# Patient Record
Sex: FEMALE | Race: ASIAN | NOT HISPANIC OR LATINO | URBAN - METROPOLITAN AREA
[De-identification: names, ages, dates, MRNs, and addresses within clinical notes are randomized per-mention and may not be internally consistent; named-entity substitution may affect disease eponyms.]

---

## 2021-06-18 ENCOUNTER — EMERGENCY (EMERGENCY)
Facility: HOSPITAL | Age: 21
LOS: 1 days | Discharge: HOME | End: 2021-06-18
Attending: EMERGENCY MEDICINE | Admitting: EMERGENCY MEDICINE
Payer: COMMERCIAL

## 2021-06-18 VITALS
HEART RATE: 61 BPM | RESPIRATION RATE: 16 BRPM | DIASTOLIC BLOOD PRESSURE: 68 MMHG | TEMPERATURE: 98 F | HEIGHT: 65 IN | WEIGHT: 177.91 LBS | SYSTOLIC BLOOD PRESSURE: 106 MMHG | OXYGEN SATURATION: 97 %

## 2021-06-18 PROCEDURE — 99285 EMERGENCY DEPT VISIT HI MDM: CPT

## 2021-06-18 RX ORDER — SODIUM CHLORIDE 9 MG/ML
1000 INJECTION INTRAMUSCULAR; INTRAVENOUS; SUBCUTANEOUS ONCE
Refills: 0 | Status: COMPLETED | OUTPATIENT
Start: 2021-06-18 | End: 2021-06-18

## 2021-06-18 RX ORDER — KETOROLAC TROMETHAMINE 30 MG/ML
15 SYRINGE (ML) INJECTION ONCE
Refills: 0 | Status: DISCONTINUED | OUTPATIENT
Start: 2021-06-18 | End: 2021-06-18

## 2021-06-18 RX ORDER — IOHEXOL 300 MG/ML
30 INJECTION, SOLUTION INTRAVENOUS ONCE
Refills: 0 | Status: COMPLETED | OUTPATIENT
Start: 2021-06-18 | End: 2021-06-18

## 2021-06-18 RX ORDER — ONDANSETRON 8 MG/1
4 TABLET, FILM COATED ORAL ONCE
Refills: 0 | Status: DISCONTINUED | OUTPATIENT
Start: 2021-06-18 | End: 2021-06-22

## 2021-06-18 RX ADMIN — SODIUM CHLORIDE 1000 MILLILITER(S): 9 INJECTION INTRAMUSCULAR; INTRAVENOUS; SUBCUTANEOUS at 23:54

## 2021-06-18 RX ADMIN — IOHEXOL 30 MILLILITER(S): 300 INJECTION, SOLUTION INTRAVENOUS at 23:55

## 2021-06-18 NOTE — ED ADULT NURSE NOTE - OBJECTIVE STATEMENT
Pt presents to ED c/o abd pain to RLQ. Pt felt sick after eating and forced herself to vomit. Pt states she had diarrhea in the morning. Pt had some burning while urinating.

## 2021-06-18 NOTE — ED PROVIDER NOTE - PROGRESS NOTE DETAILS
labs and imaging explained, will f/u with outpt gyn pt had near syncopal episode while getting up from bed, will obtain ekg, IV fluids feels better likely vasovagal, VSS

## 2021-06-18 NOTE — ED PROVIDER NOTE - ATTENDING CONTRIBUTION TO CARE
21 yo female c/o periumbilical rlq pain x a few hours after eating dinner, sudden onset, nonradiating, with 1 episode of nausea/vomiting.  NO fever/chills.      Gen: Alert, NAD  Head/eyes: NC/AT, PERRL  ENT: airway patent  Neck: supple, no tenderness/meningismus/JVD, Trachea midline  Pulm/lung: Bilateral clear BS, normal resp effort, no wheeze/stridor/retractions  CV/heart: RRR, no M/R/G, +2 dist pulses (radial, pedal DP/PT, popliteal)  GI/Abd: soft, +mild ttp RLQ/ND, +BS, no guarding/rebound tenderness  Musculoskeletal: no edema/erythema/cyanosis, FROM in all extremities, no C/T/L spine ttp  Skin: no rash, no vesicles, no petechaie, no ecchymosis, no swelling  Neuro: AAOx3, CN 2-12 intact, normal sensation, 5/5 motor strength in all extremities, normal gait    CT negative for appendicitis, +ovarian cyst rupture, IV analgesia

## 2021-06-18 NOTE — ED PROVIDER NOTE - PATIENT PORTAL LINK FT
You can access the FollowMyHealth Patient Portal offered by [facility] by registering at the following website: [email or web address]. By joining [employer]’s FollowMyHealth portal, you will also be able to view your health information using other applications (apps) compatible with our system.

## 2021-06-18 NOTE — ED PROVIDER NOTE - NSFOLLOWUPINSTRUCTIONS_ED_ALL_ED_FT
1) Follow-up with your Primary Medical Doctor and GYN. Call today / next business day for prompt follow-up.  2) Return to Emergency room for any worsening or persistent pain, weakness, fever, or any other concerning symptoms.  3) See attached instruction sheets for additional information, including information regarding signs and symptoms to look out for, reasons to seek immediate care and other important instructions.  4) Advil 600mg every 6 hours as needed for pain. Take with food.      WHAT YOU NEED TO KNOW:    A ruptured ovarian cyst is a cyst that breaks open. A cyst is a sac that grows on an ovary. This sac usually contains fluid, but may sometimes have blood or tissue in it. A large cyst that ruptures may lead to problems that need immediate care. It is important to follow up with your healthcare provider to make sure your cyst went away completely with treatment.     DISCHARGE INSTRUCTIONS:    Call 911 for any of the following:   •You are too weak or dizzy to stand up.          Return to the emergency department if:   •You have severe pain in your pelvis or in your abdomen.       •You have pain along with a fever, nausea, or vomiting.       •You have signs of shock from blood loss, such as dizziness, cold or clammy skin, or fast breathing.      Contact your healthcare provider if:   •You notice changes in your monthly periods, or you begin to have nausea or vomiting with your periods.      •You have new or worsening symptoms.      •Your pain does not get better with pain medicine.      •You have pain during sex.      •You have bleeding from your vagina that is not your period.      •Your abdomen is swollen, or you have a full or heavy feeling in your lower abdomen.      •You have trouble urinating.      •You have questions or concerns about your condition or care.      Medicines: You may need any of the following:   •NSAIDs, such as ibuprofen, help decrease swelling, pain, and fever. This medicine is available with or without a doctor's order. NSAIDs can cause stomach bleeding or kidney problems in certain people. If you take blood thinner medicine, always ask if NSAIDs are safe for you. Always read the medicine label and follow directions. Do not give these medicines to children under 6 months of age without direction from your child's healthcare provider.      •Antibiotics may be given to prevent or fight an infection caused by bacteria.      •Take your medicine as directed. Contact your healthcare provider if you think your medicine is not helping or if you have side effects. Tell him or her if you are allergic to any medicine. Keep a list of the medicines, vitamins, and herbs you take. Include the amounts, and when and why you take them. Bring the list or the pill bottles to follow-up visits. Carry your medicine list with you in case of an emergency.      Manage or prevent a ruptured ovarian cyst:   •Apply heat where you have pain, as directed. Heat can help relieve mild pain. Use a heating pad (set on low) or hot water bottle. Wrap the pad or bottle in a towel before you apply it to your skin. Apply heat for 20 minutes every hour, or as directed. A warm bath may also help relieve the pain.      •Ask when to come in for a follow-up examination. You may need another ultrasound 6 weeks after your cyst was treated. This will help make sure the cyst is no longer growing or causing health problems. You may also need ultrasound tests for 2 or 3 monthly periods to see how hormones affect your ovaries.      •Ask about birth control pills. These may help reduce your risk for cysts. Ask your healthcare provider if birth control pills are right for you. The risk for a blood clot is higher if you take birth control pills, especially if you are older than 35 or smoke.       •Have a pelvic exam every year. This may also be called a well woman visit. The exam will include a Pap smear to check for certain cancers. Your healthcare provider will also press on your abdomen to check for lumps or other problems. A pelvic exam can help find problems early. This makes treatment easier and more effective. Tell your healthcare provider if you notice any changes in your monthly periods. Examples include periods that start on a different day than usual, or are lighter or heavier than usual. Tell your provider if you have worse pain than usual, or if the pain is different than you had before.      Follow up with your healthcare provider as directed: Write down your questions so you remember to ask them during your visits.

## 2021-06-18 NOTE — ED PROVIDER NOTE - OBJECTIVE STATEMENT
19 yo F presents to ED c/o periumbilical/RLQ pain x a few hours after eating dinner. Pt reports one episode of nausea/vomiting. Pt denies fever/chills, recnt travel, known sick contacts.

## 2021-06-19 VITALS
HEART RATE: 76 BPM | DIASTOLIC BLOOD PRESSURE: 56 MMHG | OXYGEN SATURATION: 100 % | SYSTOLIC BLOOD PRESSURE: 101 MMHG | RESPIRATION RATE: 16 BRPM

## 2021-06-19 LAB
ALBUMIN SERPL ELPH-MCNC: 4.1 G/DL — SIGNIFICANT CHANGE UP (ref 3.3–5)
ALP SERPL-CCNC: 58 U/L — SIGNIFICANT CHANGE UP (ref 40–120)
ALT FLD-CCNC: 22 U/L — SIGNIFICANT CHANGE UP (ref 12–78)
ANION GAP SERPL CALC-SCNC: 8 MMOL/L — SIGNIFICANT CHANGE UP (ref 5–17)
APPEARANCE UR: CLEAR — SIGNIFICANT CHANGE UP
AST SERPL-CCNC: 17 U/L — SIGNIFICANT CHANGE UP (ref 15–37)
BACTERIA # UR AUTO: NEGATIVE — SIGNIFICANT CHANGE UP
BASOPHILS # BLD AUTO: 0.05 K/UL — SIGNIFICANT CHANGE UP (ref 0–0.2)
BASOPHILS NFR BLD AUTO: 0.4 % — SIGNIFICANT CHANGE UP (ref 0–2)
BILIRUB SERPL-MCNC: 0.7 MG/DL — SIGNIFICANT CHANGE UP (ref 0.2–1.2)
BILIRUB UR-MCNC: NEGATIVE — SIGNIFICANT CHANGE UP
BUN SERPL-MCNC: 15 MG/DL — SIGNIFICANT CHANGE UP (ref 7–23)
CALCIUM SERPL-MCNC: 9.2 MG/DL — SIGNIFICANT CHANGE UP (ref 8.5–10.1)
CHLORIDE SERPL-SCNC: 106 MMOL/L — SIGNIFICANT CHANGE UP (ref 96–108)
CO2 SERPL-SCNC: 26 MMOL/L — SIGNIFICANT CHANGE UP (ref 22–31)
COLOR SPEC: YELLOW — SIGNIFICANT CHANGE UP
CREAT SERPL-MCNC: 0.79 MG/DL — SIGNIFICANT CHANGE UP (ref 0.5–1.3)
DIFF PNL FLD: NEGATIVE — SIGNIFICANT CHANGE UP
EOSINOPHIL # BLD AUTO: 0.18 K/UL — SIGNIFICANT CHANGE UP (ref 0–0.5)
EOSINOPHIL NFR BLD AUTO: 1.4 % — SIGNIFICANT CHANGE UP (ref 0–6)
EPI CELLS # UR: SIGNIFICANT CHANGE UP
GLUCOSE SERPL-MCNC: 92 MG/DL — SIGNIFICANT CHANGE UP (ref 70–99)
GLUCOSE UR QL: NEGATIVE — SIGNIFICANT CHANGE UP
HCG SERPL-ACNC: <1 MIU/ML — SIGNIFICANT CHANGE UP
HCT VFR BLD CALC: 37.7 % — SIGNIFICANT CHANGE UP (ref 34.5–45)
HGB BLD-MCNC: 13.4 G/DL — SIGNIFICANT CHANGE UP (ref 11.5–15.5)
IMM GRANULOCYTES NFR BLD AUTO: 0.4 % — SIGNIFICANT CHANGE UP (ref 0–1.5)
KETONES UR-MCNC: NEGATIVE — SIGNIFICANT CHANGE UP
LEUKOCYTE ESTERASE UR-ACNC: NEGATIVE — SIGNIFICANT CHANGE UP
LYMPHOCYTES # BLD AUTO: 19.3 % — SIGNIFICANT CHANGE UP (ref 13–44)
LYMPHOCYTES # BLD AUTO: 2.46 K/UL — SIGNIFICANT CHANGE UP (ref 1–3.3)
MCHC RBC-ENTMCNC: 30.2 PG — SIGNIFICANT CHANGE UP (ref 27–34)
MCHC RBC-ENTMCNC: 35.5 GM/DL — SIGNIFICANT CHANGE UP (ref 32–36)
MCV RBC AUTO: 84.9 FL — SIGNIFICANT CHANGE UP (ref 80–100)
MONOCYTES # BLD AUTO: 0.66 K/UL — SIGNIFICANT CHANGE UP (ref 0–0.9)
MONOCYTES NFR BLD AUTO: 5.2 % — SIGNIFICANT CHANGE UP (ref 2–14)
NEUTROPHILS # BLD AUTO: 9.32 K/UL — HIGH (ref 1.8–7.4)
NEUTROPHILS NFR BLD AUTO: 73.3 % — SIGNIFICANT CHANGE UP (ref 43–77)
NITRITE UR-MCNC: NEGATIVE — SIGNIFICANT CHANGE UP
NRBC # BLD: 0 /100 WBCS — SIGNIFICANT CHANGE UP (ref 0–0)
PH UR: 6 — SIGNIFICANT CHANGE UP (ref 5–8)
PLATELET # BLD AUTO: 316 K/UL — SIGNIFICANT CHANGE UP (ref 150–400)
POTASSIUM SERPL-MCNC: 4.4 MMOL/L — SIGNIFICANT CHANGE UP (ref 3.5–5.3)
POTASSIUM SERPL-SCNC: 4.4 MMOL/L — SIGNIFICANT CHANGE UP (ref 3.5–5.3)
PROT SERPL-MCNC: 7.5 G/DL — SIGNIFICANT CHANGE UP (ref 6–8.3)
PROT UR-MCNC: NEGATIVE — SIGNIFICANT CHANGE UP
RBC # BLD: 4.44 M/UL — SIGNIFICANT CHANGE UP (ref 3.8–5.2)
RBC # FLD: 11.9 % — SIGNIFICANT CHANGE UP (ref 10.3–14.5)
RBC CASTS # UR COMP ASSIST: NEGATIVE /HPF — SIGNIFICANT CHANGE UP (ref 0–4)
SODIUM SERPL-SCNC: 140 MMOL/L — SIGNIFICANT CHANGE UP (ref 135–145)
SP GR SPEC: 1.01 — SIGNIFICANT CHANGE UP (ref 1.01–1.02)
UROBILINOGEN FLD QL: NEGATIVE — SIGNIFICANT CHANGE UP
WBC # BLD: 12.72 K/UL — HIGH (ref 3.8–10.5)
WBC # FLD AUTO: 12.72 K/UL — HIGH (ref 3.8–10.5)
WBC UR QL: SIGNIFICANT CHANGE UP

## 2021-06-19 PROCEDURE — 96374 THER/PROPH/DIAG INJ IV PUSH: CPT | Mod: XU

## 2021-06-19 PROCEDURE — 74177 CT ABD & PELVIS W/CONTRAST: CPT | Mod: 26,MG

## 2021-06-19 PROCEDURE — 85025 COMPLETE CBC W/AUTO DIFF WBC: CPT

## 2021-06-19 PROCEDURE — 84702 CHORIONIC GONADOTROPIN TEST: CPT

## 2021-06-19 PROCEDURE — 80053 COMPREHEN METABOLIC PANEL: CPT

## 2021-06-19 PROCEDURE — 36415 COLL VENOUS BLD VENIPUNCTURE: CPT

## 2021-06-19 PROCEDURE — 99284 EMERGENCY DEPT VISIT MOD MDM: CPT | Mod: 25

## 2021-06-19 PROCEDURE — G1004: CPT

## 2021-06-19 PROCEDURE — 81001 URINALYSIS AUTO W/SCOPE: CPT

## 2021-06-19 PROCEDURE — 74177 CT ABD & PELVIS W/CONTRAST: CPT

## 2021-06-19 PROCEDURE — 93010 ELECTROCARDIOGRAM REPORT: CPT

## 2021-06-19 PROCEDURE — 93005 ELECTROCARDIOGRAM TRACING: CPT

## 2021-06-19 RX ORDER — KETOROLAC TROMETHAMINE 30 MG/ML
30 SYRINGE (ML) INJECTION ONCE
Refills: 0 | Status: DISCONTINUED | OUTPATIENT
Start: 2021-06-19 | End: 2021-06-19

## 2021-06-19 RX ORDER — SODIUM CHLORIDE 9 MG/ML
1000 INJECTION INTRAMUSCULAR; INTRAVENOUS; SUBCUTANEOUS ONCE
Refills: 0 | Status: COMPLETED | OUTPATIENT
Start: 2021-06-19 | End: 2021-06-19

## 2021-06-19 RX ADMIN — Medication 30 MILLIGRAM(S): at 04:22

## 2021-06-19 RX ADMIN — SODIUM CHLORIDE 1000 MILLILITER(S): 9 INJECTION INTRAMUSCULAR; INTRAVENOUS; SUBCUTANEOUS at 04:22

## 2021-06-19 NOTE — ED ADULT NURSE REASSESSMENT NOTE - NS ED NURSE REASSESS COMMENT FT1
Pt just got up to walk to the bathroom and syncopized. Pt felt very lightheaded and mother held up the patient. ECG performed and 1L bolus hung of NS. Pt just got up to walk to the bathroom and syncopized. Pt felt very lightheaded and mother held up the patient. ECG performed, placed on cardiac monitor with pulse ox. and 1L bolus hung of NS.

## 2021-06-21 PROBLEM — Z00.00 ENCOUNTER FOR PREVENTIVE HEALTH EXAMINATION: Status: ACTIVE | Noted: 2021-06-21

## 2021-06-25 ENCOUNTER — APPOINTMENT (OUTPATIENT)
Dept: OBGYN | Facility: CLINIC | Age: 21
End: 2021-06-25
Payer: COMMERCIAL

## 2021-06-25 VITALS
DIASTOLIC BLOOD PRESSURE: 78 MMHG | SYSTOLIC BLOOD PRESSURE: 127 MMHG | WEIGHT: 166 LBS | BODY MASS INDEX: 27.66 KG/M2 | HEIGHT: 65 IN | TEMPERATURE: 96.8 F

## 2021-06-25 DIAGNOSIS — Z78.9 OTHER SPECIFIED HEALTH STATUS: ICD-10-CM

## 2021-06-25 DIAGNOSIS — Z83.3 FAMILY HISTORY OF DIABETES MELLITUS: ICD-10-CM

## 2021-06-25 DIAGNOSIS — Z82.49 FAMILY HISTORY OF ISCHEMIC HEART DISEASE AND OTHER DISEASES OF THE CIRCULATORY SYSTEM: ICD-10-CM

## 2021-06-25 DIAGNOSIS — Z87.42 PERSONAL HISTORY OF OTHER DISEASES OF THE FEMALE GENITAL TRACT: ICD-10-CM

## 2021-06-25 PROCEDURE — 99203 OFFICE O/P NEW LOW 30 MIN: CPT

## 2021-06-25 PROCEDURE — 99072 ADDL SUPL MATRL&STAF TM PHE: CPT

## 2021-06-26 PROBLEM — Z87.42 HISTORY OF OVARIAN CYST: Status: ACTIVE | Noted: 2021-06-26

## 2021-06-26 LAB
ESTIMATED AVERAGE GLUCOSE: 108 MG/DL
ESTRADIOL SERPL-MCNC: 38 PG/ML
FSH SERPL-MCNC: 6.6 IU/L
HBA1C MFR BLD HPLC: 5.4 %
LH SERPL-ACNC: 9.8 IU/L
PROLACTIN SERPL-MCNC: 12.4 NG/ML
TSH SERPL-ACNC: 3.41 UIU/ML

## 2021-06-30 LAB — 17OHP SERPL-MCNC: 32 NG/DL

## 2021-07-02 LAB — DHEA-SULFATE, SERUM: 270 UG/DL

## 2021-07-20 ENCOUNTER — APPOINTMENT (OUTPATIENT)
Dept: OBGYN | Facility: CLINIC | Age: 21
End: 2021-07-20
Payer: COMMERCIAL

## 2021-07-20 PROCEDURE — 99212 OFFICE O/P EST SF 10 MIN: CPT | Mod: 95

## 2021-08-02 LAB
TESTOST BND SERPL-MCNC: 2.5 PG/ML
TESTOSTERONE TOTAL S: 52 NG/DL

## 2021-08-18 ENCOUNTER — APPOINTMENT (OUTPATIENT)
Dept: OBGYN | Facility: CLINIC | Age: 21
End: 2021-08-18
Payer: MEDICAID

## 2021-08-18 DIAGNOSIS — N92.6 IRREGULAR MENSTRUATION, UNSPECIFIED: ICD-10-CM

## 2021-08-18 PROCEDURE — 99212 OFFICE O/P EST SF 10 MIN: CPT | Mod: 95

## 2021-08-18 RX ORDER — MEDROXYPROGESTERONE ACETATE 10 MG/1
10 TABLET ORAL DAILY
Qty: 10 | Refills: 3 | Status: ACTIVE | COMMUNITY
Start: 2021-08-18

## 2021-12-10 ENCOUNTER — APPOINTMENT (OUTPATIENT)
Dept: OBGYN | Facility: CLINIC | Age: 21
End: 2021-12-10

## 2021-12-14 ENCOUNTER — APPOINTMENT (OUTPATIENT)
Dept: OBGYN | Facility: CLINIC | Age: 21
End: 2021-12-14

## 2021-12-15 ENCOUNTER — LABORATORY RESULT (OUTPATIENT)
Age: 21
End: 2021-12-15

## 2021-12-16 ENCOUNTER — APPOINTMENT (OUTPATIENT)
Dept: OBGYN | Facility: CLINIC | Age: 21
End: 2021-12-16
Payer: MEDICAID

## 2021-12-16 ENCOUNTER — OUTPATIENT (OUTPATIENT)
Dept: OUTPATIENT SERVICES | Facility: HOSPITAL | Age: 21
LOS: 1 days | End: 2021-12-16
Payer: MEDICAID

## 2021-12-16 VITALS — WEIGHT: 166 LBS | SYSTOLIC BLOOD PRESSURE: 118 MMHG | DIASTOLIC BLOOD PRESSURE: 70 MMHG

## 2021-12-16 DIAGNOSIS — N92.6 IRREGULAR MENSTRUATION, UNSPECIFIED: ICD-10-CM

## 2021-12-16 DIAGNOSIS — N76.0 ACUTE VAGINITIS: ICD-10-CM

## 2021-12-16 DIAGNOSIS — Z01.419 ENCOUNTER FOR GYNECOLOGICAL EXAMINATION (GENERAL) (ROUTINE) W/OUT ABNORMAL FINDINGS: ICD-10-CM

## 2021-12-16 PROCEDURE — 87491 CHLMYD TRACH DNA AMP PROBE: CPT

## 2021-12-16 PROCEDURE — 99203 OFFICE O/P NEW LOW 30 MIN: CPT

## 2021-12-16 PROCEDURE — 87591 N.GONORRHOEAE DNA AMP PROB: CPT

## 2021-12-16 PROCEDURE — G0463: CPT

## 2021-12-16 NOTE — DISCUSSION/SUMMARY
[FreeTextEntry1] : 22yo G0-   annual gyn exam\par # vaginal lump - RESOLVED\par - exam unremarkable. ? bartholins/ skenes/sebacous cyst.  Instructed pt to RTC if lump ever reappears for evaluation \par \par #HCM\par [ ] PAP collected\par [ ] GC/CT cultures sent\par - Pt counseled on the importance of Gardasil vaccine. States she will contact pediatrician for records.\par - declined all contraception\par \par RTC for eveline/prn or for Gardasil if never received. \par \par LJ CrS seen with \par Sujit Kelley, PAC

## 2021-12-16 NOTE — HISTORY OF PRESENT ILLNESS
[N] : Patient denies prior pregnancies [Normal Amount/Duration] :  normal amount and duration [Regular Cycle Intervals] : periods have been regular [Frequency: Q ___ days] : menstrual periods occur approximately every [unfilled] days [Never active] : never active [LMPDate] : 11/18/21 [MensesFreq] : 28 [MensesLength] : 4 [FreeTextEntry1] : 11/28/21

## 2021-12-16 NOTE — PHYSICAL EXAM
[Chaperone Present] : A chaperone was present in the examining room during all aspects of the physical examination [FreeTextEntry1] : PAS [Appropriately responsive] : appropriately responsive [Alert] : alert [No Acute Distress] : no acute distress [No Lymphadenopathy] : no lymphadenopathy [Regular Rate Rhythm] : regular rate rhythm [No Murmurs] : no murmurs [Clear to Auscultation B/L] : clear to auscultation bilaterally [Soft] : soft [Non-tender] : non-tender [Non-distended] : non-distended [No HSM] : No HSM [No Lesions] : no lesions [No Mass] : no mass [Oriented x3] : oriented x3 [Examination Of The Breasts] : a normal appearance [No Masses] : no breast masses were palpable [Labia Majora] : normal [Labia Minora] : normal [No Bleeding] : There was no active vaginal bleeding [Normal] : normal [Tenderness] : nontender [Enlarged ___ wks] : not enlarged [Mass ___ cm] : no uterine mass was palpated [Uterine Adnexae] : normal

## 2021-12-16 NOTE — COUNSELING
[Drugs/Alcohol] : drugs, alcohol [Breast Self Exam] : breast self exam [Contraception/ Emergency Contraception/ Safe Sexual Practices] : contraception, emergency contraception, safe sexual practices [STD (testing, results, tx)] : STD (testing, results, tx) [HPV Vaccine] : HPV Vaccine

## 2021-12-17 LAB
C TRACH RRNA SPEC QL NAA+PROBE: SIGNIFICANT CHANGE UP
N GONORRHOEA RRNA SPEC QL NAA+PROBE: SIGNIFICANT CHANGE UP
SPECIMEN SOURCE: SIGNIFICANT CHANGE UP

## 2021-12-20 LAB — CYTOLOGY SPEC DOC CYTO: SIGNIFICANT CHANGE UP

## 2022-01-06 ENCOUNTER — TRANSCRIPTION ENCOUNTER (OUTPATIENT)
Age: 22
End: 2022-01-06

## 2022-12-14 ENCOUNTER — OFFICE (OUTPATIENT)
Dept: URBAN - METROPOLITAN AREA CLINIC 109 | Facility: CLINIC | Age: 22
Setting detail: OPHTHALMOLOGY
End: 2022-12-14
Payer: COMMERCIAL

## 2022-12-14 DIAGNOSIS — H52.13: ICD-10-CM

## 2022-12-14 DIAGNOSIS — H40.013: ICD-10-CM

## 2022-12-14 PROCEDURE — 92014 COMPRE OPH EXAM EST PT 1/>: CPT | Performed by: OPHTHALMOLOGY

## 2022-12-14 PROCEDURE — 92133 CPTRZD OPH DX IMG PST SGM ON: CPT | Performed by: OPHTHALMOLOGY

## 2022-12-14 PROCEDURE — 92015 DETERMINE REFRACTIVE STATE: CPT | Performed by: OPHTHALMOLOGY

## 2022-12-14 ASSESSMENT — PACHYMETRY
OD_CT_CORRECTION: -3
OS_CT_CORRECTION: -3
OS_CT_UM: 582
OD_CT_UM: 583

## 2022-12-14 ASSESSMENT — SPHEQUIV_DERIVED
OD_SPHEQUIV: -8.125
OS_SPHEQUIV: -6.875
OS_SPHEQUIV: -7.625
OD_SPHEQUIV: -6.75

## 2022-12-14 ASSESSMENT — REFRACTION_CURRENTRX
OS_AXIS: 163
OS_VPRISM_DIRECTION: SV
OS_OVR_VA: 20/
OS_CYLINDER: -1.00
OD_OVR_VA: 20/
OD_VPRISM_DIRECTION: SV
OD_SPHERE: -5.75
OS_OVR_VA: 20/
OS_SPHERE: -5.75
OD_AXIS: 2
OD_OVR_VA: 20/
OD_CYLINDER: -1.00

## 2022-12-14 ASSESSMENT — REFRACTION_MANIFEST
OS_CYLINDER: -1.25
OD_CYLINDER: -1.00
OD_SPHERE: -6.25
OS_SPHERE: -6.25
OS_AXIS: 165
OD_VA1: 20/20
OS_VA1: 20/20
OD_AXIS: 5

## 2022-12-14 ASSESSMENT — VISUAL ACUITY
OD_BCVA: 20/20-1
OS_BCVA: 20/20-2

## 2022-12-14 ASSESSMENT — REFRACTION_AUTOREFRACTION
OS_SPHERE: -7.00
OS_CYLINDER: -1.25
OD_CYLINDER: -1.25
OD_SPHERE: -7.50
OS_AXIS: 166
OD_AXIS: 6

## 2022-12-14 ASSESSMENT — CONFRONTATIONAL VISUAL FIELD TEST (CVF)
OD_FINDINGS: FULL
OS_FINDINGS: FULL

## 2023-05-25 ENCOUNTER — OFFICE (OUTPATIENT)
Dept: URBAN - METROPOLITAN AREA CLINIC 109 | Facility: CLINIC | Age: 23
Setting detail: OPHTHALMOLOGY
End: 2023-05-25
Payer: COMMERCIAL

## 2023-05-25 DIAGNOSIS — H10.89: ICD-10-CM

## 2023-05-25 PROCEDURE — 99213 OFFICE O/P EST LOW 20 MIN: CPT | Performed by: OPHTHALMOLOGY

## 2023-05-25 ASSESSMENT — CONFRONTATIONAL VISUAL FIELD TEST (CVF)
OD_FINDINGS: FULL
OS_FINDINGS: FULL

## 2023-05-25 ASSESSMENT — SPHEQUIV_DERIVED
OD_SPHEQUIV: -6.75
OS_SPHEQUIV: -6.875
OS_SPHEQUIV: -7.625
OD_SPHEQUIV: -8.125

## 2023-05-25 ASSESSMENT — REFRACTION_MANIFEST
OS_VA1: 20/20
OS_AXIS: 165
OS_SPHERE: -6.25
OD_AXIS: 5
OD_CYLINDER: -1.00
OD_SPHERE: -6.25
OS_CYLINDER: -1.25
OD_VA1: 20/20

## 2023-05-25 ASSESSMENT — PACHYMETRY
OD_CT_UM: 583
OS_CT_UM: 582
OD_CT_CORRECTION: -3
OS_CT_CORRECTION: -3

## 2023-05-25 ASSESSMENT — TONOMETRY
OS_IOP_MMHG: 21
OD_IOP_MMHG: 20

## 2023-05-25 ASSESSMENT — VISUAL ACUITY
OS_BCVA: 20/20
OD_BCVA: 20/20

## 2023-05-25 ASSESSMENT — REFRACTION_AUTOREFRACTION
OS_AXIS: 166
OD_AXIS: 6
OS_CYLINDER: -1.25
OD_SPHERE: -7.50
OD_CYLINDER: -1.25
OS_SPHERE: -7.00

## 2023-05-25 ASSESSMENT — REFRACTION_CURRENTRX
OD_SPHERE: -5.75
OD_AXIS: 2
OS_SPHERE: -5.75
OD_OVR_VA: 20/
OS_CYLINDER: -1.00
OS_AXIS: 163
OD_CYLINDER: -1.00
OS_VPRISM_DIRECTION: SV
OD_OVR_VA: 20/
OS_OVR_VA: 20/
OS_OVR_VA: 20/
OD_VPRISM_DIRECTION: SV

## 2023-06-05 ENCOUNTER — OFFICE (OUTPATIENT)
Dept: URBAN - METROPOLITAN AREA CLINIC 109 | Facility: CLINIC | Age: 23
Setting detail: OPHTHALMOLOGY
End: 2023-06-05
Payer: COMMERCIAL

## 2023-06-05 DIAGNOSIS — H10.89: ICD-10-CM

## 2023-06-05 PROCEDURE — 99212 OFFICE O/P EST SF 10 MIN: CPT | Performed by: OPHTHALMOLOGY

## 2023-06-05 ASSESSMENT — REFRACTION_MANIFEST
OS_VA1: 20/20
OD_AXIS: 5
OD_VA1: 20/20
OS_SPHERE: -6.25
OD_CYLINDER: -1.00
OS_AXIS: 165
OS_CYLINDER: -1.25
OD_SPHERE: -6.25

## 2023-06-05 ASSESSMENT — SPHEQUIV_DERIVED
OS_SPHEQUIV: -7.625
OD_SPHEQUIV: -6.75
OD_SPHEQUIV: -8.125
OS_SPHEQUIV: -6.875

## 2023-06-05 ASSESSMENT — REFRACTION_CURRENTRX
OS_CYLINDER: -1.00
OD_AXIS: 2
OS_OVR_VA: 20/
OD_OVR_VA: 20/
OS_SPHERE: -5.75
OD_OVR_VA: 20/
OS_AXIS: 163
OD_SPHERE: -5.75
OS_VPRISM_DIRECTION: SV
OD_CYLINDER: -1.00
OS_OVR_VA: 20/
OD_VPRISM_DIRECTION: SV

## 2023-06-05 ASSESSMENT — TONOMETRY
OD_IOP_MMHG: 19
OS_IOP_MMHG: 21

## 2023-06-05 ASSESSMENT — PACHYMETRY
OD_CT_CORRECTION: -3
OS_CT_CORRECTION: -3
OS_CT_UM: 582
OD_CT_UM: 583

## 2023-06-05 ASSESSMENT — VISUAL ACUITY
OD_BCVA: 20/20
OS_BCVA: 20/20

## 2023-06-05 ASSESSMENT — REFRACTION_AUTOREFRACTION
OD_CYLINDER: -1.25
OS_CYLINDER: -1.25
OS_AXIS: 166
OD_SPHERE: -7.50
OS_SPHERE: -7.00
OD_AXIS: 6

## 2023-06-05 ASSESSMENT — CONFRONTATIONAL VISUAL FIELD TEST (CVF)
OD_FINDINGS: FULL
OS_FINDINGS: FULL

## 2023-12-11 ENCOUNTER — OFFICE (OUTPATIENT)
Dept: URBAN - METROPOLITAN AREA CLINIC 109 | Facility: CLINIC | Age: 23
Setting detail: OPHTHALMOLOGY
End: 2023-12-11
Payer: COMMERCIAL

## 2023-12-11 DIAGNOSIS — H40.013: ICD-10-CM

## 2023-12-11 PROCEDURE — 92133 CPTRZD OPH DX IMG PST SGM ON: CPT | Performed by: OPHTHALMOLOGY

## 2023-12-11 PROCEDURE — 92083 EXTENDED VISUAL FIELD XM: CPT | Performed by: OPHTHALMOLOGY

## 2023-12-11 PROCEDURE — 92012 INTRM OPH EXAM EST PATIENT: CPT | Performed by: OPHTHALMOLOGY

## 2023-12-11 ASSESSMENT — REFRACTION_MANIFEST
OD_CYLINDER: -1.00
OS_AXIS: 165
OS_VA1: 20/20
OD_AXIS: 5
OD_SPHERE: -6.25
OS_CYLINDER: -1.25
OS_SPHERE: -6.25
OD_VA1: 20/20

## 2023-12-11 ASSESSMENT — SPHEQUIV_DERIVED
OS_SPHEQUIV: -6.875
OS_SPHEQUIV: -7.625
OD_SPHEQUIV: -6.75
OD_SPHEQUIV: -8.125

## 2023-12-11 ASSESSMENT — REFRACTION_AUTOREFRACTION
OS_CYLINDER: -1.25
OD_AXIS: 6
OD_CYLINDER: -1.25
OD_SPHERE: -7.50
OS_SPHERE: -7.00
OS_AXIS: 166

## 2023-12-11 ASSESSMENT — REFRACTION_CURRENTRX
OS_AXIS: 163
OS_SPHERE: -5.75
OS_CYLINDER: -1.00
OD_OVR_VA: 20/
OD_SPHERE: -5.75
OD_AXIS: 2
OS_VPRISM_DIRECTION: SV
OS_OVR_VA: 20/
OS_OVR_VA: 20/
OD_OVR_VA: 20/
OD_CYLINDER: -1.00
OD_VPRISM_DIRECTION: SV

## 2023-12-11 ASSESSMENT — CONFRONTATIONAL VISUAL FIELD TEST (CVF)
OD_FINDINGS: FULL
OS_FINDINGS: FULL

## 2024-02-28 ENCOUNTER — EMERGENCY (EMERGENCY)
Facility: HOSPITAL | Age: 24
LOS: 1 days | Discharge: HOME | End: 2024-02-28
Attending: EMERGENCY MEDICINE
Payer: COMMERCIAL

## 2024-02-28 VITALS
RESPIRATION RATE: 17 BRPM | DIASTOLIC BLOOD PRESSURE: 65 MMHG | TEMPERATURE: 98 F | OXYGEN SATURATION: 100 % | HEART RATE: 64 BPM | SYSTOLIC BLOOD PRESSURE: 98 MMHG

## 2024-02-28 VITALS
DIASTOLIC BLOOD PRESSURE: 66 MMHG | TEMPERATURE: 98 F | SYSTOLIC BLOOD PRESSURE: 95 MMHG | HEIGHT: 65 IN | WEIGHT: 156.09 LBS | RESPIRATION RATE: 18 BRPM | OXYGEN SATURATION: 98 % | HEART RATE: 59 BPM

## 2024-02-28 LAB
ALBUMIN SERPL ELPH-MCNC: 4.7 G/DL — SIGNIFICANT CHANGE UP (ref 3.3–5)
ALP SERPL-CCNC: 53 U/L — SIGNIFICANT CHANGE UP (ref 40–120)
ALT FLD-CCNC: 16 U/L — SIGNIFICANT CHANGE UP (ref 10–45)
ANION GAP SERPL CALC-SCNC: 14 MMOL/L — SIGNIFICANT CHANGE UP (ref 5–17)
APPEARANCE UR: CLEAR — SIGNIFICANT CHANGE UP
AST SERPL-CCNC: 20 U/L — SIGNIFICANT CHANGE UP (ref 10–40)
BASE EXCESS BLDV CALC-SCNC: -1.6 MMOL/L — SIGNIFICANT CHANGE UP (ref -2–3)
BASOPHILS # BLD AUTO: 0.03 K/UL — SIGNIFICANT CHANGE UP (ref 0–0.2)
BASOPHILS NFR BLD AUTO: 0.4 % — SIGNIFICANT CHANGE UP (ref 0–2)
BILIRUB SERPL-MCNC: 1.5 MG/DL — HIGH (ref 0.2–1.2)
BILIRUB UR-MCNC: NEGATIVE — SIGNIFICANT CHANGE UP
BUN SERPL-MCNC: 17 MG/DL — SIGNIFICANT CHANGE UP (ref 7–23)
CA-I SERPL-SCNC: 1.23 MMOL/L — SIGNIFICANT CHANGE UP (ref 1.15–1.33)
CALCIUM SERPL-MCNC: 9.5 MG/DL — SIGNIFICANT CHANGE UP (ref 8.4–10.5)
CHLORIDE BLDV-SCNC: 102 MMOL/L — SIGNIFICANT CHANGE UP (ref 96–108)
CHLORIDE SERPL-SCNC: 101 MMOL/L — SIGNIFICANT CHANGE UP (ref 96–108)
CK SERPL-CCNC: 66 U/L — SIGNIFICANT CHANGE UP (ref 25–170)
CO2 BLDV-SCNC: 27 MMOL/L — HIGH (ref 22–26)
CO2 SERPL-SCNC: 22 MMOL/L — SIGNIFICANT CHANGE UP (ref 22–31)
COLOR SPEC: YELLOW — SIGNIFICANT CHANGE UP
CREAT SERPL-MCNC: 0.68 MG/DL — SIGNIFICANT CHANGE UP (ref 0.5–1.3)
DIFF PNL FLD: NEGATIVE — SIGNIFICANT CHANGE UP
EGFR: 125 ML/MIN/1.73M2 — SIGNIFICANT CHANGE UP
EOSINOPHIL # BLD AUTO: 0.06 K/UL — SIGNIFICANT CHANGE UP (ref 0–0.5)
EOSINOPHIL NFR BLD AUTO: 0.8 % — SIGNIFICANT CHANGE UP (ref 0–6)
GAS PNL BLDV: 133 MMOL/L — LOW (ref 136–145)
GAS PNL BLDV: SIGNIFICANT CHANGE UP
GLUCOSE BLDV-MCNC: 83 MG/DL — SIGNIFICANT CHANGE UP (ref 70–99)
GLUCOSE SERPL-MCNC: 88 MG/DL — SIGNIFICANT CHANGE UP (ref 70–99)
GLUCOSE UR QL: NEGATIVE MG/DL — SIGNIFICANT CHANGE UP
HCG SERPL-ACNC: <2 MIU/ML — SIGNIFICANT CHANGE UP
HCO3 BLDV-SCNC: 26 MMOL/L — SIGNIFICANT CHANGE UP (ref 22–29)
HCT VFR BLD CALC: 39.6 % — SIGNIFICANT CHANGE UP (ref 34.5–45)
HCT VFR BLDA CALC: 42 % — SIGNIFICANT CHANGE UP (ref 34.5–46.5)
HGB BLD CALC-MCNC: 14.1 G/DL — SIGNIFICANT CHANGE UP (ref 11.7–16.1)
HGB BLD-MCNC: 13.8 G/DL — SIGNIFICANT CHANGE UP (ref 11.5–15.5)
IMM GRANULOCYTES NFR BLD AUTO: 0.3 % — SIGNIFICANT CHANGE UP (ref 0–0.9)
KETONES UR-MCNC: NEGATIVE MG/DL — SIGNIFICANT CHANGE UP
LACTATE BLDV-MCNC: 1.1 MMOL/L — SIGNIFICANT CHANGE UP (ref 0.5–2)
LEUKOCYTE ESTERASE UR-ACNC: NEGATIVE — SIGNIFICANT CHANGE UP
LYMPHOCYTES # BLD AUTO: 1.47 K/UL — SIGNIFICANT CHANGE UP (ref 1–3.3)
LYMPHOCYTES # BLD AUTO: 19.1 % — SIGNIFICANT CHANGE UP (ref 13–44)
MCHC RBC-ENTMCNC: 30.3 PG — SIGNIFICANT CHANGE UP (ref 27–34)
MCHC RBC-ENTMCNC: 34.8 GM/DL — SIGNIFICANT CHANGE UP (ref 32–36)
MCV RBC AUTO: 86.8 FL — SIGNIFICANT CHANGE UP (ref 80–100)
MONOCYTES # BLD AUTO: 0.34 K/UL — SIGNIFICANT CHANGE UP (ref 0–0.9)
MONOCYTES NFR BLD AUTO: 4.4 % — SIGNIFICANT CHANGE UP (ref 2–14)
NEUTROPHILS # BLD AUTO: 5.76 K/UL — SIGNIFICANT CHANGE UP (ref 1.8–7.4)
NEUTROPHILS NFR BLD AUTO: 75 % — SIGNIFICANT CHANGE UP (ref 43–77)
NITRITE UR-MCNC: NEGATIVE — SIGNIFICANT CHANGE UP
NRBC # BLD: 0 /100 WBCS — SIGNIFICANT CHANGE UP (ref 0–0)
PCO2 BLDV: 52 MMHG — HIGH (ref 39–42)
PH BLDV: 7.3 — LOW (ref 7.32–7.43)
PH UR: 7 — SIGNIFICANT CHANGE UP (ref 5–8)
PLATELET # BLD AUTO: 267 K/UL — SIGNIFICANT CHANGE UP (ref 150–400)
PO2 BLDV: 19 MMHG — LOW (ref 25–45)
POTASSIUM BLDV-SCNC: 3.8 MMOL/L — SIGNIFICANT CHANGE UP (ref 3.5–5.1)
POTASSIUM SERPL-MCNC: 3.8 MMOL/L — SIGNIFICANT CHANGE UP (ref 3.5–5.3)
POTASSIUM SERPL-SCNC: 3.8 MMOL/L — SIGNIFICANT CHANGE UP (ref 3.5–5.3)
PROLACTIN SERPL-MCNC: 24.1 NG/ML — SIGNIFICANT CHANGE UP (ref 3.4–24.1)
PROT SERPL-MCNC: 7.6 G/DL — SIGNIFICANT CHANGE UP (ref 6–8.3)
PROT UR-MCNC: NEGATIVE MG/DL — SIGNIFICANT CHANGE UP
RBC # BLD: 4.56 M/UL — SIGNIFICANT CHANGE UP (ref 3.8–5.2)
RBC # FLD: 11.8 % — SIGNIFICANT CHANGE UP (ref 10.3–14.5)
SAO2 % BLDV: 26.1 % — LOW (ref 67–88)
SODIUM SERPL-SCNC: 137 MMOL/L — SIGNIFICANT CHANGE UP (ref 135–145)
SP GR SPEC: 1.02 — SIGNIFICANT CHANGE UP (ref 1–1.03)
TROPONIN T, HIGH SENSITIVITY RESULT: <6 NG/L — SIGNIFICANT CHANGE UP (ref 0–51)
UROBILINOGEN FLD QL: 0.2 MG/DL — SIGNIFICANT CHANGE UP (ref 0.2–1)
WBC # BLD: 7.68 K/UL — SIGNIFICANT CHANGE UP (ref 3.8–10.5)
WBC # FLD AUTO: 7.68 K/UL — SIGNIFICANT CHANGE UP (ref 3.8–10.5)

## 2024-02-28 PROCEDURE — 99285 EMERGENCY DEPT VISIT HI MDM: CPT

## 2024-02-28 PROCEDURE — 82435 ASSAY OF BLOOD CHLORIDE: CPT

## 2024-02-28 PROCEDURE — 82803 BLOOD GASES ANY COMBINATION: CPT

## 2024-02-28 PROCEDURE — 71046 X-RAY EXAM CHEST 2 VIEWS: CPT | Mod: 26

## 2024-02-28 PROCEDURE — 84132 ASSAY OF SERUM POTASSIUM: CPT

## 2024-02-28 PROCEDURE — 85018 HEMOGLOBIN: CPT

## 2024-02-28 PROCEDURE — 84702 CHORIONIC GONADOTROPIN TEST: CPT

## 2024-02-28 PROCEDURE — 81003 URINALYSIS AUTO W/O SCOPE: CPT

## 2024-02-28 PROCEDURE — 84146 ASSAY OF PROLACTIN: CPT

## 2024-02-28 PROCEDURE — 80053 COMPREHEN METABOLIC PANEL: CPT

## 2024-02-28 PROCEDURE — 85014 HEMATOCRIT: CPT

## 2024-02-28 PROCEDURE — 93005 ELECTROCARDIOGRAM TRACING: CPT

## 2024-02-28 PROCEDURE — 71046 X-RAY EXAM CHEST 2 VIEWS: CPT

## 2024-02-28 PROCEDURE — 82330 ASSAY OF CALCIUM: CPT

## 2024-02-28 PROCEDURE — 85025 COMPLETE CBC W/AUTO DIFF WBC: CPT

## 2024-02-28 PROCEDURE — 99285 EMERGENCY DEPT VISIT HI MDM: CPT | Mod: 25

## 2024-02-28 PROCEDURE — 82550 ASSAY OF CK (CPK): CPT

## 2024-02-28 PROCEDURE — 84484 ASSAY OF TROPONIN QUANT: CPT

## 2024-02-28 PROCEDURE — 82947 ASSAY GLUCOSE BLOOD QUANT: CPT

## 2024-02-28 PROCEDURE — 84295 ASSAY OF SERUM SODIUM: CPT

## 2024-02-28 PROCEDURE — 83605 ASSAY OF LACTIC ACID: CPT

## 2024-02-28 RX ORDER — SODIUM CHLORIDE 9 MG/ML
1000 INJECTION INTRAMUSCULAR; INTRAVENOUS; SUBCUTANEOUS ONCE
Refills: 0 | Status: COMPLETED | OUTPATIENT
Start: 2024-02-28 | End: 2024-02-28

## 2024-02-28 RX ADMIN — SODIUM CHLORIDE 1000 MILLILITER(S): 9 INJECTION INTRAMUSCULAR; INTRAVENOUS; SUBCUTANEOUS at 13:20

## 2024-02-28 NOTE — ED ADULT NURSE NOTE - NSFALLUNIVINTERV_ED_ALL_ED
Bed/Stretcher in lowest position, wheels locked, appropriate side rails in place/Call bell, personal items and telephone in reach/Instruct patient to call for assistance before getting out of bed/chair/stretcher/Non-slip footwear applied when patient is off stretcher/[place] to call system/Physically safe environment - no spills, clutter or unnecessary equipment/Purposeful proactive rounding/Room/bathroom lighting operational, light cord in reach

## 2024-02-28 NOTE — ED ADULT TRIAGE NOTE - CHIEF COMPLAINT QUOTE
witnessed seizure x2 Body started to shake during blood drawing Last seizure at 7 yrs of age Not on meds Alert and orientedx4 No injury noted

## 2024-02-28 NOTE — ED ADULT NURSE NOTE - OBJECTIVE STATEMENT
22 y/o female presents to ED via EMS from gastroenterologist office. PMH of seizures. Pt states  she was going for a routine check up/consultation. Pt was getting blood drawn when she felt fuzzy/ lost consciousness and states it took her about 10 minutes to wake up. Witnessed. Per EMS pt was shaking during time of LOC. Pt currently denies SOB, CP, HA, cough, dizziness, vision changes, N/V/D, abd pain. Pt is A&Ox3, well appearing/ speaking full sentences without difficulty. Airway patent with spontaneous unlabored breathing, Equal B/L upper and lower extremity strength, skin is warm and normal color for race. No diaphoresis or edema noted. Abd soft, nondistended and non tender to palpation.

## 2024-02-28 NOTE — ED PROVIDER NOTE - OBJECTIVE STATEMENT
23-year-old female remote history of seizures when she was a child diagnosed at age 7 positive EEG unspecified type of seizure seems to have been tonic-clonic in nature was on medications for about 5 years and discontinued as a teenager.  Patient coming in from a doctor's office today after during a routine blood draw she began to get lightheaded vision went gray and she passed out.  According to people in the office she had generalized shaking activity regained consciousness and passed out quickly again.  After awakening took about 15 minutes for her to become oriented as to where she was.  Did not bite her tongue no fecal incontinence no urinary incontinence.  Has not had a seizure like this in many years.  Patient does endorse having a history of syncope while getting her blood drawn in the past but did not have an episode like this

## 2024-02-28 NOTE — CONSULT NOTE ADULT - SUBJECTIVE AND OBJECTIVE BOX
Neurology - Consult Note    -  Spectra: [id number] ([facility]), [phone] ([facility])  -    HPI: Patient FREDI BARRIGA is a 23y (2000) woman with PMHx significant for syncope, seizure x1 at age 7 with prior AED ( family is unclear which medication and duration "a few years". was followed by Dr. Oneal pediatric neurology; with prior negative EEGs only as per mother)  who presented to the emergency room from outpatient GI for episode of convulsion.  Patient accompanied by mother who aided in history.  As per patient, she was getting a routine blood drawn when she felt nauseous and lightheaded, vision went gray and subsequently had "full body convulsions" with LOC. Patient stated that there were three back to back episodes of convulsions however she was able to provide further specifics. She does not know the duration of events. Not witnessed by mother bedside.  denied tongue laceration, urinary or fecal incontinence.   She denied headache, numbness, weakness, vision, or gait changes. Patient feels shes was post ictal for around 15 minutes but is not certain. She denied any acute infections, toxic habits.     Of note patient had a similar episode of LOC with nausea prodrome during blood draw at around age 17 while at her pcp, however she does not recall convulsions during that time. She obtained an EEG (unclear duration) around 1 week post episode that was normal and she was never placed back on AEDs.    Per mother, pt had a normal  delivery with no febrile seizures or developmental delay.     Patient had one previous unprovoked seizure x 1 with previous AED use (unclear which medication) at age 7 or 8. During that time was followed by Dr. Oneal peds neuro and had normal EEGs. No other seizure like events.     Patient is back to neurological baseline.     She works in real estate currently and graduated school with no cognitive set backs.       Review of Systems:     CONSTITUTIONAL: No fevers or chills  EYES AND ENT: No visual changes or no throat pain   NECK: No pain or stiffness  RESPIRATORY: No hemoptysis or shortness of breath  CARDIOVASCULAR: No chest pain or palpitations  GASTROINTESTINAL: No melena or hematochezia  GENITOURINARY: No dysuria or hematuria  NEUROLOGICAL: +As stated in HPI above  SKIN: No itching, burning, rashes, or lesions   All other review of systems is negative unless indicated above.    Allergies:  No Known Allergies      PMHx/PSHx/Family Hx: As above, otherwise see below   Seizure        Social Hx:  No reported use of tobacco, alcohol, or illicit drugs    Medications:  MEDICATIONS  (STANDING):  sodium chloride 0.9% Bolus 1000 milliLiter(s) IV Bolus once    MEDICATIONS  (PRN):        Home Medications:      Vitals:  T(C): 36.5 (24 @ 12:27), Max: 36.5 (24 @ 12:27)  HR: 59 (24 @ 12:27) (59 - 59)  BP: 95/66 (24 @ 12:27) (95/66 - 95/66)  RR: 18 (24 @ 12:27) (18 - 18)  SpO2: 98% (24 @ 12:27) (98% - 98%)    Physical Examination:    General - NAD  Cardiovascular - Peripheral pulses palpable, no edema    Neurologic Exam:  Mental status - Awake, Alert, Oriented to person, place, and time. Speech fluent, repetition and naming intact. Follows simple and complex commands. attention, concentration and fund of knowledge intact.     Cranial nerves:  CN II: Visual fields are full to confrontation. Pupils are 3 mm and briskly reactive to light.   CN III, IV, VI: EOMI, no nystagmus, no ptosis  CN V: Facial sensation is intact to pinprick in all 3 divisions bilaterally.  CN VII: Face is symmetric with normal eye closure and smile.  CN VII: Hearing is normal to rubbing fingers  CN IX, X: Palate elevates symmetrically. Phonation is normal.  CN XI: Head turning and shoulder shrug are intact  CN XII: Tongue is midline with normal movements and no atrophy.    Motor - Normal bulk and tone throughout. No pronator drift.  Strength testing            Deltoid      Biceps      Triceps     Wrist Extension    Wrist Flexion       R            5                 5               5                     5                              5                        5  L             5                 5               5                     5                              5                       5              Hip Flexion    Hip Extension    Knee Flexion    Knee Extension    Dorsiflexion    Plantar Flexion  R              5                           5                       5                           5                            5                          5  L              5                           5                        5                           5                            5                          5    neg hoffmans b/l    Sensation - Light touch/temperature intact throughout    DTR's -             Biceps      Triceps     Brachioradialis      Patellar    Ankle    Toes/plantar response  R             2+             2+                  2+                       2+            2+                 Down  L              2+             2+                 2+                        2+           2+                 Down    Coordination - Finger to Nose and heal to shin intact b/l. No tremors appreciated    Gait and station - Normal casual gait. Romberg (-)    Labs:          CAPILLARY BLOOD GLUCOSE               Radiology:

## 2024-02-28 NOTE — ED PROVIDER NOTE - CARE PROVIDER_API CALL
Marlyn Ochoa  Neurology  [address] [place] [address]  Phone: [phone]  Fax: [phone]  Follow Up Time:

## 2024-02-28 NOTE — ED PROVIDER NOTE - PATIENT PORTAL LINK FT
You can access the FollowMyHealth Patient Portal offered by [facility] by registering at the following website: [email or web address]. By joining [employer]’s FollowMyHealth portal, you will also be able to view your health information using other applications (apps) compatible with our system.

## 2024-02-28 NOTE — ED PROVIDER NOTE - ATTENDING APP SHARED VISIT CONTRIBUTION OF CARE
This is a 23-year-old female who is coming in after having a episode while she was getting a blood draw at her GI doctor's office.  She got woozy and passed out and was noted to be shaking and reportedly lost consciousness 3 times while in the chair.  She says that it happens not when she was being poked but as the blood was being taken out of her arm.  She has had this happen during a blood draw once before and she also notes that she has as a child probably a seizure condition as at age 5 she had a seizure and underwent testing and was put on antiepileptic drugs for about 10 years.  She no longer on them.  She also notes that she syncopized at least 6 times over the last few years.  Awake alert talking no acute distress.  No signs of tongue bite.  Regular rate and rhythm clear lungs nontender abdomen neuroexam is normal.  Given the context of the episode I suspect that this is most likely to be syncope with associated shaking during the syncopal episode however her history makes me slightly more concerned.  Timing and nature may be vasovagal.  Will do screening blood work in  including hCG blood glucose sodium.  Neurology was consulted by the physician assistant who will see the patient.  No indication for head CAT scan at this time.  As her neuroexam is completely normal right now.  EKG is reassuring as well.

## 2024-02-28 NOTE — CONSULT NOTE ADULT - ASSESSMENT
23y (2000) woman with PMHx significant for syncope, seizure x1 at age 7 with prior AED ( family is unclear which medication and duration "a few years". was followed by Dr. Oneal pediatric neurology; with prior negative EEGs only as per mother)  who presented to the emergency room from outpatient GI for episode of convulsion.  Patient accompanied by mother who aided in history.  As per patient, she was getting blood drawn when she felt nauseous and subsequently had "full body convulsions" with LOC. Patient stated that there were three back to back episodes of convulsions however she was able to provide further specifics. She does not know the duration of events. Not witnessed by mother bedside.  denied tongue laceration, urinary or fecal incontinence.   She denied headache, numbness, weakness, vision, or gait changes. Patient feels shes was post ictal for around 15 minutes but is not certain. She denied any acute infections, toxic habits. Does have prior unprovoked seizure and was placed on AED for some duration of time with negative EEGs.     Impression:  Episode of syncope and convulsion with prodrome of nausea vision loss and lightheadedness likely secondary to  convulsive vasovagal/ convulsive syncope clinically less likely seizure versus cardiogenic syncope.     Recommendations    INCOMPLETE    23y (2000) woman with PMHx significant for syncope, seizure x1 at age 7 with prior AED ( family is unclear which medication and duration "a few years". was followed by Dr. Oneal pediatric neurology; with prior negative EEGs only as per mother)  who presented to the emergency room from outpatient GI for episode of convulsion.  Patient accompanied by mother who aided in history.  As per patient, she was getting blood drawn when she felt nauseous and subsequently had "full body convulsions" with LOC. Patient stated that there were three back to back episodes of convulsions however she was able to provide further specifics. She does not know the duration of events. Not witnessed by mother bedside.  denied tongue laceration, urinary or fecal incontinence.   She denied headache, numbness, weakness, vision, or gait changes. Patient feels shes was post ictal for around 15 minutes but is not certain. She denied any acute infections, toxic habits. Does have prior unprovoked seizure and was placed on AED for some duration of time with negative EEGs.     Impression:  Episode of syncope and convulsion with prodrome of nausea vision loss and lightheadedness likely secondary to  convulsive vasovagal/ convulsive syncope clinically less likely seizure versus cardiogenic syncope.     Recommendations    No further impatient work up from a neurological standpoint can follow up as outpatient.     Rest of work up per primary team     Patient can follow up with epilepsy neurology at [address] [place] [address] 1-2 weeks after discharge. Please instruct the patient to call [phone] to schedule this appointment.   Alternatively patient can follow up with Marlyn Ochoa in [place]. [phone]    Case discussed with Dr. Gill

## 2024-02-28 NOTE — ED PROVIDER NOTE - NSFOLLOWUPINSTRUCTIONS_ED_ALL_ED_FT
1- Rest, stay hydrated  2- Neurology recommendations:  Patient can follow up with epilepsy neurology at [address] [place] [address] 1-2 weeks after discharge. Please instruct the patient to call [phone] to schedule this appointment.   Alternatively patient can follow up with Marlyn Ochoa in [place]. [phone]  3- if you feel lightheaded, you pass out, have a seizure, or if you have any new or worsening concerns come back to the ER immediately  4- Do not drive until neurology clears you to do so

## 2024-02-29 PROBLEM — R56.9 UNSPECIFIED CONVULSIONS: Chronic | Status: ACTIVE | Noted: 2024-02-28

## 2024-03-01 ENCOUNTER — APPOINTMENT (OUTPATIENT)
Age: 24
End: 2024-03-01
Payer: COMMERCIAL

## 2024-03-01 VITALS
HEART RATE: 57 BPM | HEIGHT: 65 IN | SYSTOLIC BLOOD PRESSURE: 109 MMHG | DIASTOLIC BLOOD PRESSURE: 72 MMHG | BODY MASS INDEX: 25.33 KG/M2 | WEIGHT: 152 LBS

## 2024-03-01 DIAGNOSIS — R55 SYNCOPE AND COLLAPSE: ICD-10-CM

## 2024-03-01 PROCEDURE — 99204 OFFICE O/P NEW MOD 45 MIN: CPT

## 2024-03-01 NOTE — HISTORY OF PRESENT ILLNESS
[FreeTextEntry1] : 22 yo RH woman who was seen in the ER about 2 days ago after having syncope while at her gastroenterologist's office while having her blood drawn.  She has a history of syncope induced by a blood draw about 5-6 years ago (saw a neurologist after this event which was normal).  2 days ago while her blood was being drawn, she suddenly felt nauseous, then blacked out, and woke up sitting down with people around.  She knew where she was.  She reportedly was in and out of consciousness 3 times, and had some generalized convulsive movements, but no urinary incontinence or tongue injury.    In the past, she had an allergic reaction to Kiwi which lead to syncope.    Another time she had a ruptured ovarian cyst which lead to syncope.  She has a history of epilepsy in childhood at age 7 (only had one seizure) and was on an AED for about 4 years (does not remember which one), and has been off AEDs since age 12.    No history of head trauma/meningitis/encephalitis/febrile seizures/birth trauma/developmental delay/stroke/brain tumor.  No family history of epilepsy   PCP: Dr Olivier Caraballo   She is a .  She drives.

## 2024-03-01 NOTE — PHYSICAL EXAM
[FreeTextEntry1] : Awake, alert, oriented x 3.  Language fluent.  Comprehension intact.  Naming intact.  Repetition intact.  Affect normal.  Cranial nerves grossly intact.  Motor exam: normal bulk, normal tone, 5/5 in all four extremities.  No tremors or fasciculations.  Sensory exam: intact to LT. Coordination: no dysmetria.  Gait: normal gait.

## 2024-03-01 NOTE — ASSESSMENT
[FreeTextEntry1] : 22 yo woman with history suggestive of convulsive syncope (likely vasovagal). 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  Plan: 1. Routine EEG 2. Ensure adequate hydration, heed warning signs of presyncope 3. Follow up with PCP regarding cardiac work up 4. Patient agrees with plan.   Gustabo Larry MD [facility]  Greater than 50% of the encounter was spent on counseling and coordination of care discussing differential diagnosis, diagnostic testing, and treatment options. We have talked about appropriate follow up, and I have spent 45 minutes of face to face time with the patient.

## 2024-03-05 ENCOUNTER — APPOINTMENT (OUTPATIENT)
Dept: NEUROLOGY | Facility: CLINIC | Age: 24
End: 2024-03-05

## 2024-03-06 ENCOUNTER — APPOINTMENT (OUTPATIENT)
Dept: NEUROLOGY | Facility: CLINIC | Age: 24
End: 2024-03-06
Payer: COMMERCIAL

## 2024-03-06 PROCEDURE — 95816 EEG AWAKE AND DROWSY: CPT
